# Patient Record
(demographics unavailable — no encounter records)

---

## 2024-11-08 NOTE — COUNSELING
[Drugs] : drugs [HPV Vaccine] : HPV Vaccine [Nutrition/ Exercise/ Weight Management] : nutrition, exercise, weight management [Body Image] : body image [Vitamins/Supplements] : vitamins/supplements [Contraception/ Emergency Contraception/ Safe Sexual Practices] : contraception, emergency contraception, safe sexual practices [Confidentiality] : confidentiality [STD (testing, results, tx)] : STD (testing, results, tx)

## 2024-11-08 NOTE — PHYSICAL EXAM
[Chaperone Present] : A chaperone was present in the examining room during all aspects of the physical examination [21717] : A chaperone was present during the pelvic exam. [Appropriately responsive] : appropriately responsive [Alert] : alert [No Acute Distress] : no acute distress [Soft] : soft [Non-tender] : non-tender [No Lesions] : no lesions [No Mass] : no mass [Oriented x3] : oriented x3 [Examination Of The Breasts] : a normal appearance [No Masses] : no breast masses were palpable [Labia Majora] : normal [Labia Minora] : normal [Normal] : normal [Uterine Adnexae] : normal [FreeTextEntry5] : Unable to visualize IUD strings

## 2024-11-08 NOTE — HISTORY OF PRESENT ILLNESS
[IUD] : has an intrauterine device [Y] : Patient is sexually active [N] : Patient denies prior pregnancies [Regular Cycle Intervals] : periods have been regular [Frequency: Q ___ days] : menstrual periods occur approximately every [unfilled] days [Menarche Age: ____] : age at menarche was [unfilled] [FreeTextEntry1] : 27 y.o P0 (LMP 10/14/24) presenting for gyn annual  Last seen by OB/GYN last year for routine annual.  No acute GYN complaints.   Reports menses monthly.  Denies any intermenstrual bleeding or spotting.   Had ParaGard IUD placed roughly 10 years ago, time for removal.  Would like removal today.  Sexually active with male partner, does not use any form of contraception.  Would like to give body a break for 2 to 3 months after IUD removal before replacing with new ParaGard IUD.  Screening: - Pap (08/2023): WNL per patient   ObHx: denies GynHx: Menses q28d, lasting 5days. Denies hx of ovarian cysts, hx of fibroids, hx of endometriosis, hx of STD's PMH: Denies PSH: Denies ALL: NKDA SocHx: Lives with family. Feels safe at home. Denies depression or anxiety related symptoms. Never used tobacco products, denies illicit drug use/EtOH. FamHx: no significant family history of GYN malignancy or disease. Paternal grandmother and aunt- stomach cancer [PGHxTotal] : 0 [PGHxFullTerm] : 0 [PGHxPremature] : 0 [PGHxAbortions] : 0 [[facility] : [facility] [PGHxABInduced] : 0 [PGHxABSpont] : 0 [PGHxEctopic] : 0 [PGHxMultBirths] : 0

## 2024-11-08 NOTE — DISCUSSION/SUMMARY
[FreeTextEntry1] : 27 y.o P0 (LMP 10/14/24) presenting for gyn annual    #Well Woman Visit  1. Nutrition/Activity: The benefits of physical activity and balanced diet.  2. Health Screening: She was informed of the benefits of a screening Pap. - Cervix: We reviewed ASCCP/ACOG guidelines for pap smear screening. PAP collected today. 3. Sex Health: The importance of safe-sex practices was discussed with the patient. STD screening was offered to patient, she accepts g/c testing 4. Contraception: Paraguard IUD ~ 10 years ago, due for removal. Would like replaced after 2-3 month break 5. Denies any hx of DM/HTN 6. Unsure if received Gardasil vaccine--will look through medical records.   #IUD -Unable to visualize IUD strings today.  Unable to probe IUD strings from cervical canal using Cytobrush -Discussed pelvic ultrasound to assess IUD location.  Discussed that if IUD present in endometrium, would require hysteroscopic removal either in office or hospital setting-would like an office removal -Discussed backup contraception-condom use at this time     She verbalized understanding and agreement with above counseling regarding differential diagnosis, evaluation, and plan. She was given time for questions/concerns which were all answered to her apparent satisfaction.    RTO in after US for discussion

## 2024-11-08 NOTE — HISTORY OF PRESENT ILLNESS
[IUD] : has an intrauterine device [Y] : Patient is sexually active [N] : Patient denies prior pregnancies [Regular Cycle Intervals] : periods have been regular [Frequency: Q ___ days] : menstrual periods occur approximately every [unfilled] days [Menarche Age: ____] : age at menarche was [unfilled] [FreeTextEntry1] : 27 y.o P0 (LMP 10/14/24) presenting for gyn annual  Last seen by OB/GYN last year for routine annual.  No acute GYN complaints.   Reports menses monthly.  Denies any intermenstrual bleeding or spotting.   Had ParaGard IUD placed roughly 10 years ago, time for removal.  Would like removal today.  Sexually active with male partner, does not use any form of contraception.  Would like to give body a break for 2 to 3 months after IUD removal before replacing with new ParaGard IUD.  Screening: - Pap (08/2023): WNL per patient   ObHx: denies GynHx: Menses q28d, lasting 5days. Denies hx of ovarian cysts, hx of fibroids, hx of endometriosis, hx of STD's PMH: Denies PSH: Denies ALL: NKDA SocHx: Lives with family. Feels safe at home. Denies depression or anxiety related symptoms. Never used tobacco products, denies illicit drug use/EtOH. FamHx: no significant family history of GYN malignancy or disease. Paternal grandmother and aunt- stomach cancer [PGHxTotal] : 0 [PGHxFullTerm] : 0 [PGHxPremature] : 0 [PGHxAbortions] : 0 [[facility]] : 0 [PGHxABInduced] : 0 [PGHxABSpont] : 0 [PGHxEctopic] : 0 [PGHxMultBirths] : 0

## 2024-11-08 NOTE — PHYSICAL EXAM
[Chaperone Present] : A chaperone was present in the examining room during all aspects of the physical examination [74893] : A chaperone was present during the pelvic exam. [Appropriately responsive] : appropriately responsive [Alert] : alert [No Acute Distress] : no acute distress [Soft] : soft [Non-tender] : non-tender [No Lesions] : no lesions [No Mass] : no mass [Oriented x3] : oriented x3 [Examination Of The Breasts] : a normal appearance [No Masses] : no breast masses were palpable [Labia Majora] : normal [Labia Minora] : normal [Normal] : normal [Uterine Adnexae] : normal [FreeTextEntry5] : Unable to visualize IUD strings

## 2024-12-02 NOTE — PROCEDURE
[Hysteroscopy] : Hysteroscopy [Time out performed] : Pre-procedure time out performed.  Patient's name, date of birth and procedure confirmed. [Consent Obtained] : Consent obtained [Risks] : risks [Benefits] : benefits [Alternatives] : alternatives [Infection] : infection [Bleeding] : bleeding [Allergic Reaction] : allergic reaction [Lidocaine___ mL] : [unfilled] ~UmL of lidocaine [flexible] : Using aseptic technique a hysteroscopy was performed using a flexible hysteroscope [Tolerated Well] : Patient tolerated the procedure well [Aftercare instructions/regstrictions given and follow-up scheduled] : Aftercare instructions/restrictions given and follow-up scheduled [de-identified] : IUD strings visualized in lower uterine segment.  Unable to remove at bedside

## 2024-12-02 NOTE — ASSESSMENT
[FreeTextEntry1] : 27 y.o P0 (LMP 11/9/24) presenting for Hysteroscopic IUD removal  Plan:  - Pelvic US (11/2024): Endometrium: 1 mm. IUD noted within the endometrial cavity. -Attempts made to remove IUD at bedside via in-house hysteroscopy device, unable to grasp IUD strings -Discussed recommendation for in-hospital procedure for hysteroscopic removal and IUD reinsertion -Patient verbalized understanding.  Will begin surgical coordination